# Patient Record
Sex: MALE | Race: WHITE | Employment: OTHER | URBAN - NONMETROPOLITAN AREA
[De-identification: names, ages, dates, MRNs, and addresses within clinical notes are randomized per-mention and may not be internally consistent; named-entity substitution may affect disease eponyms.]

---

## 2021-08-23 ENCOUNTER — HOSPITAL ENCOUNTER (OUTPATIENT)
Dept: INFUSION THERAPY | Age: 56
Discharge: HOME | End: 2021-08-23
Payer: COMMERCIAL

## 2021-08-23 ENCOUNTER — OFFICE VISIT (OUTPATIENT)
Dept: ONCOLOGY | Age: 56
End: 2021-08-23
Payer: COMMERCIAL

## 2021-08-23 VITALS
HEIGHT: 69 IN | TEMPERATURE: 97.3 F | OXYGEN SATURATION: 97 % | SYSTOLIC BLOOD PRESSURE: 148 MMHG | HEART RATE: 75 BPM | BODY MASS INDEX: 32.64 KG/M2 | RESPIRATION RATE: 18 BRPM | DIASTOLIC BLOOD PRESSURE: 92 MMHG | WEIGHT: 220.4 LBS

## 2021-08-23 DIAGNOSIS — R71.8 ELEVATED HEMATOCRIT: ICD-10-CM

## 2021-08-23 DIAGNOSIS — D58.2 ELEVATED HEMOGLOBIN (HCC): Primary | ICD-10-CM

## 2021-08-23 DIAGNOSIS — T38.7X5A ADVERSE EFFECT OF TESTOSTERONE, INITIAL ENCOUNTER: ICD-10-CM

## 2021-08-23 DIAGNOSIS — D75.1 ERYTHROCYTOSIS: ICD-10-CM

## 2021-08-23 PROCEDURE — 99211 OFF/OP EST MAY X REQ PHY/QHP: CPT

## 2021-08-23 PROCEDURE — 99244 OFF/OP CNSLTJ NEW/EST MOD 40: CPT | Performed by: INTERNAL MEDICINE

## 2021-08-23 RX ORDER — TAMSULOSIN HYDROCHLORIDE 0.4 MG/1
0.4 CAPSULE ORAL DAILY
COMMUNITY

## 2021-08-23 RX ORDER — BACLOFEN 10 MG/1
10 TABLET ORAL 3 TIMES DAILY
COMMUNITY

## 2021-08-23 RX ORDER — LEVOTHYROXINE SODIUM 0.05 MG/1
50 TABLET ORAL DAILY
COMMUNITY

## 2021-08-23 RX ORDER — IBUPROFEN 200 MG
200 TABLET ORAL EVERY 6 HOURS PRN
COMMUNITY

## 2021-08-23 RX ORDER — SILDENAFIL 100 MG/1
100 TABLET, FILM COATED ORAL PRN
COMMUNITY

## 2021-08-23 RX ORDER — MELOXICAM 15 MG/1
15 TABLET ORAL DAILY
COMMUNITY

## 2021-08-23 RX ORDER — TIZANIDINE 4 MG/1
4 TABLET ORAL EVERY 6 HOURS PRN
COMMUNITY

## 2021-08-23 RX ORDER — CETIRIZINE HYDROCHLORIDE 10 MG/1
10 TABLET ORAL DAILY
COMMUNITY

## 2021-08-23 RX ORDER — LOPERAMIDE HYDROCHLORIDE 2 MG/1
2 CAPSULE ORAL 4 TIMES DAILY PRN
COMMUNITY

## 2021-08-23 RX ORDER — TESTOSTERONE 30 MG/1.5ML
1 SOLUTION TOPICAL WEEKLY
COMMUNITY
End: 2022-01-04 | Stop reason: ALTCHOICE

## 2021-08-23 RX ORDER — PANTOPRAZOLE SODIUM 40 MG/1
40 TABLET, DELAYED RELEASE ORAL DAILY
COMMUNITY

## 2021-08-23 RX ORDER — ASPIRIN 81 MG/1
81 TABLET ORAL DAILY
COMMUNITY

## 2021-08-23 RX ORDER — SUCRALFATE 1 G/1
1 TABLET ORAL 4 TIMES DAILY
COMMUNITY

## 2021-08-23 RX ORDER — ATENOLOL 50 MG/1
50 TABLET ORAL DAILY
COMMUNITY

## 2021-08-23 RX ORDER — FLUOXETINE HYDROCHLORIDE 20 MG/1
20 CAPSULE ORAL DAILY
COMMUNITY

## 2021-08-23 NOTE — PATIENT INSTRUCTIONS
1.  Schedule phlebotomy therapy x1 at THE [facility] in [place]. 2.  Labs on return to clinic.

## 2021-09-12 NOTE — PROGRESS NOTES
[facility] CENTER  CANCER NETWORK OF [facility]  [facility] [address] [facility]  [address]  [address]  Dept: [phone]  Dept Fax: [phone]: [phone]     Encounter Date:  08/23/2021    Primary Provider: MD Dr. Neftali Villasenor,    Thank you for your referral of this patient for evaluation of erythrocytosis. I appreciate your  trust of my care for your patients. I know that you know this patient's history well, but I will summarize for my records. In addition, my recommendations and plan of care are summarized below. Please call if you have any questions or if I can be of any further assistance to you or this patient. Leon Rojas:      Chief Complaint:  Prisca La is a 64 y.o. with a history of elevated red blood cell count. HPI:  This is the first visit to the [facility] for this patient who was referred by Anastasiia Fitzpatrick MD for evaluation of erythrocytosis. The patient is a pleasant  male who has a history of erythrocytosis. He has had previous hematology evaluation and therapy by Dr. Ruiz Monday. His hematological condition is primarily erythrocytosis as the patient has not had leukocytosis or thrombocytosis. Of most significant note the patient has been treated with testosterone. This is almost certainly contributing or the likely sole factor of his erythrocytosis. On laboratory studies his hemoglobin and hematocrit are elevated but his total white blood cell count and platelet count are normal.  The patient has received intermittent phlebotomy therapy to maintain adequate hemoglobin hematocrit levels. Kelly Pacheco has been having his CBC checked approximately once every eight weeks to make a decision regarding proceeding with phlebotomy therapy. He has also been on a daily aspirin.   He reports that he has been on testosterone injections weekly 97.3 °F (36.3 °C)   SpO2: 97%   Vitals reviewed and are stable. Constitutional: Well-developed. No acute distress. HENT: Normocephalic and atraumatic. Eyes: PERRL. No scleral icterus. Neck: Overall appearance is symmetrical. No identifiable mass. Chest: Inspection and palpation of chest is normal.  Pulmonary: Effort normal. No respiratory distress. Cardiovascular: RRR. No edema in any of the four extremities. Abdominal: No hepatomegaly or splenomegaly. Musculoskeletal: Gait is normal. Muscle strength and tone appear normal.  Neurological: Alert and oriented to person, place, and time. Judgment and thought content normal.  Skin: Skin appears warm and dry. Psychiatric: Mood and affect appropriate for the clinical situation. Assessment:   1. Erythrocytosis. 2.  Elevated hemoglobin. 3.  Elevated hematocrit. 4.  Chronic testosterone use. Recommendations:   1. Proceed with phlebotomy therapy x1 at [address] [place] [address].  2.  Monitor hemoglobin and hematocrit. 3.  Patient will discuss with his primary care provider the possibility of discontinuing the use of testosterone and monitoring for symptoms related to low testosterone levels. Multiple questions were answered throughout the course the consultation. By the end of the consultation, all the patient's questions had been answered. The patient was asked to call if there were any questions or concerns prior to the next scheduled clinic visit. Deandre Patel M.D.                                                                          Medical Director: [facility]  Cancer [address] [facility], [address] [place] [address], [address] [place] [address], [address] [place] [address] of the [facility] at the [facility]      **This report has been created using voice recognition software. It may contain minor errors which are inherent in voice recognition technology. **

## 2021-09-27 ENCOUNTER — HOSPITAL ENCOUNTER (OUTPATIENT)
Dept: INFUSION THERAPY | Age: 56
Discharge: HOME | End: 2021-09-27
Payer: COMMERCIAL

## 2021-09-27 ENCOUNTER — OFFICE VISIT (OUTPATIENT)
Dept: ONCOLOGY | Age: 56
End: 2021-09-27
Payer: COMMERCIAL

## 2021-09-27 VITALS
RESPIRATION RATE: 16 BRPM | WEIGHT: 220.8 LBS | OXYGEN SATURATION: 93 % | DIASTOLIC BLOOD PRESSURE: 77 MMHG | HEART RATE: 64 BPM | TEMPERATURE: 98 F | BODY MASS INDEX: 32.7 KG/M2 | HEIGHT: 69 IN | SYSTOLIC BLOOD PRESSURE: 125 MMHG

## 2021-09-27 DIAGNOSIS — R71.8 ELEVATED HEMATOCRIT: ICD-10-CM

## 2021-09-27 DIAGNOSIS — D58.2 ELEVATED HEMOGLOBIN (HCC): ICD-10-CM

## 2021-09-27 DIAGNOSIS — D58.2 ELEVATED HEMOGLOBIN (HCC): Primary | ICD-10-CM

## 2021-09-27 LAB
ABSOLUTE IMMATURE GRANULOCYTE: 0.01 THOU/MM3 (ref 0–0.07)
BASINOPHIL, AUTOMATED: 0 % (ref 0–3)
BASOPHILS ABSOLUTE: 0 THOU/MM3 (ref 0–0.1)
BUN, WHOLE BLOOD: 16 MG/DL (ref 8–26)
CHLORIDE, WHOLE BLOOD: 99 MEQ/L (ref 98–109)
CREATININE, WHOLE BLOOD: 1.1 MG/DL (ref 0.5–1.2)
EOSINOPHILS ABSOLUTE: 0.1 THOU/MM3 (ref 0–0.4)
EOSINOPHILS RELATIVE PERCENT: 1 % (ref 0–4)
GFR, ESTIMATED ,CON: 74 ML/MIN/1.73M2
GLUCOSE, WHOLE BLOOD: 98 MG/DL (ref 70–108)
HCT VFR BLD CALC: 49.1 % (ref 42–52)
HEMOGLOBIN: 17.5 GM/DL (ref 14–18)
IMMATURE GRANULOCYTES: 0 %
IONIZED CALCIUM, WHOLE BLOOD: 1.1 MMOL/L (ref 1.12–1.32)
LYMPHOCYTES # BLD: 35 % (ref 15–47)
LYMPHOCYTES ABSOLUTE: 1.9 THOU/MM3 (ref 1–4.8)
MCH RBC QN AUTO: 33.8 PG (ref 26–33)
MCHC RBC AUTO-ENTMCNC: 35.6 GM/DL (ref 32.2–35.5)
MCV RBC AUTO: 95 FL (ref 80–94)
MONOCYTES ABSOLUTE: 0.5 THOU/MM3 (ref 0.4–1.3)
MONOCYTES: 9 % (ref 0–12)
PDW BLD-RTO: 11.7 % (ref 11.5–14.5)
PLATELET # BLD: 154 THOU/MM3 (ref 130–400)
PMV BLD AUTO: 10.3 FL (ref 9.4–12.4)
POTASSIUM, WHOLE BLOOD: 3.1 MEQ/L (ref 3.5–4.9)
RBC # BLD: 5.18 MILL/MM3 (ref 4.7–6.1)
SEG NEUTROPHILS: 55 % (ref 43–75)
SEGMENTED NEUTROPHILS ABSOLUTE COUNT: 3 THOU/MM3 (ref 1.8–7.7)
SODIUM, WHOLE BLOOD: 142 MEQ/L (ref 138–146)
TOTAL CO2, WHOLE BLOOD: 29 MEQ/L (ref 23–33)
WBC # BLD: 5.6 THOU/MM3 (ref 4.8–10.8)

## 2021-09-27 PROCEDURE — 80047 BASIC METABLC PNL IONIZED CA: CPT

## 2021-09-27 PROCEDURE — 85025 COMPLETE CBC W/AUTO DIFF WBC: CPT

## 2021-09-27 PROCEDURE — 99211 OFF/OP EST MAY X REQ PHY/QHP: CPT

## 2021-09-27 PROCEDURE — 99213 OFFICE O/P EST LOW 20 MIN: CPT | Performed by: INTERNAL MEDICINE

## 2021-09-27 PROCEDURE — 36415 COLL VENOUS BLD VENIPUNCTURE: CPT

## 2021-09-27 NOTE — PROGRESS NOTES
[facility] CANCER CENTER  CANCER NETWORK OF [facility]  [facility] [address] [facility] 98  [address]  [address]  Dept: [phone]  Dept Fax: [phone]: [phone]     Encounter Date:  08/23/2021    Primary Provider: Ari Dexter MD     Subjective:      Chief Complaint:  Paola Kamara is a 64 y.o. with a history of elevated red blood cell count. The patient is a pleasant  male who has a history of erythrocytosis. He has had previous hematology evaluation and therapy by Dr. Pavel Saavedra. His hematological condition is primarily erythrocytosis as the patient has not had leukocytosis or thrombocytosis. Of most significant note the patient has been treated with testosterone. This is almost certainly contributing or the likely sole factor of his erythrocytosis. On laboratory studies his hemoglobin and hematocrit are elevated but his total white blood cell count and platelet count are normal.  The patient has received intermittent phlebotomy therapy to maintain adequate hemoglobin hematocrit levels. Traci Farrell has been having his CBC checked approximately once every eight weeks to make a decision regarding proceeding with phlebotomy therapy. HPI:    The patient is here today for follow-up regarding his history of elevated hemoglobin elevated hematocrit. Since being seen here last he did undergo 1 phlebotomy therapy without significant complications. On laboratory studies today his hemoglobin and hematocrit have improved. The patient states that he has stopped using testosterone therapy which is the most likely contributing factor to his elevated hemoglobin and hematocrit. He currently feels well at this time. The patient denies shortness of breath, chest pain, a change in bowel habits or a change in bladder habits. ECOG performance status is level 0.       PMH, SH, and FH:  I reviewed the patients medication list and allergy list as noted on the electronic medical record. The PMH, SH and FH were also reviewed as noted on the EMR. Review of Systems  Constitutional: Negative. HENT: Negative. Eyes: Negative. Respiratory: Negative. Cardiovascular: Negative. Gastrointestinal: Negative. Genitourinary: Negative. Musculoskeletal: Negative. Skin: Negative. Neurological: Negative. Hematological: Negative. Psychiatric/Behavioral: Negative. Objective:   Physical Exam  Vitals:    09/27/21 1357   BP: 125/77   Pulse: 64   Resp: 16   Temp: 98 °F (36.7 °C)   SpO2: 93%   Vitals reviewed and are stable. Constitutional: Well-developed. No acute distress. HENT: Normocephalic and atraumatic. Eyes: Pupils appear equal and reactive. Neck: Overall appearance is symmetrical. No identifiable masses. Pulmonary: Effort normal. No respiratory distress. .  Neurological: Alert and oriented to person, place, and time. Judgment and thought content normal.  Skin: Skin is warm and dry. No rash. Psychiatric: Mood and affect appropriate for the clinical situation. Data Analysis: The following laboratory studies were reviewed with the patient today:    Hematology 9/27/2021   WBC 5.6   RBC 5.18   HGB 17.5   HCT 49.1   MCV 95 (H)   RDW 11.7        Assessment:   1. Erythrocytosis. 2.  Elevated hemoglobin. 3.  Elevated hematocrit. 4.  History of exogenous testosterone use. Recommendations:   1. Monitor hemoglobin and hematocrit. 2.  Avoid the use of testosterone therapy as tolerated. Charles Griffin M.D.                                                                          Medical Director: [address] [facility], [address] [place] [address] [place] of the [facility] at the [facility]      **This report has been created using voice recognition software. It may contain minor errors which are inherent in voice recognition technology. **

## 2022-01-04 ENCOUNTER — OFFICE VISIT (OUTPATIENT)
Dept: ONCOLOGY | Age: 57
End: 2022-01-04
Payer: COMMERCIAL

## 2022-01-04 ENCOUNTER — HOSPITAL ENCOUNTER (OUTPATIENT)
Dept: INFUSION THERAPY | Age: 57
Discharge: HOME | End: 2022-01-04
Payer: COMMERCIAL

## 2022-01-04 VITALS
WEIGHT: 226.8 LBS | HEIGHT: 69 IN | DIASTOLIC BLOOD PRESSURE: 71 MMHG | HEART RATE: 83 BPM | BODY MASS INDEX: 33.59 KG/M2 | SYSTOLIC BLOOD PRESSURE: 131 MMHG | OXYGEN SATURATION: 97 % | TEMPERATURE: 98 F | RESPIRATION RATE: 18 BRPM

## 2022-01-04 DIAGNOSIS — D58.2 ELEVATED HEMOGLOBIN (HCC): Primary | ICD-10-CM

## 2022-01-04 DIAGNOSIS — D58.2 ELEVATED HEMOGLOBIN (HCC): ICD-10-CM

## 2022-01-04 DIAGNOSIS — D75.1 ERYTHROCYTOSIS: ICD-10-CM

## 2022-01-04 LAB
ABSOLUTE IMMATURE GRANULOCYTE: 0.06 THOU/MM3 (ref 0–0.07)
ALBUMIN SERPL-MCNC: 4.7 G/DL (ref 3.5–5.1)
ALP BLD-CCNC: 74 U/L (ref 38–126)
ALT SERPL-CCNC: 53 U/L (ref 11–66)
AST SERPL-CCNC: 41 U/L (ref 5–40)
BASINOPHIL, AUTOMATED: 0 % (ref 0–3)
BASOPHILS ABSOLUTE: 0 THOU/MM3 (ref 0–0.1)
BILIRUB SERPL-MCNC: 0.4 MG/DL (ref 0.3–1.2)
BILIRUBIN DIRECT: < 0.2 MG/DL (ref 0–0.3)
BUN, WHOLE BLOOD: 16 MG/DL (ref 8–26)
CHLORIDE, WHOLE BLOOD: 101 MEQ/L (ref 98–109)
CREATININE, WHOLE BLOOD: 1.2 MG/DL (ref 0.5–1.2)
EOSINOPHILS ABSOLUTE: 0 THOU/MM3 (ref 0–0.4)
EOSINOPHILS RELATIVE PERCENT: 0 % (ref 0–4)
GFR, ESTIMATED ,CON: 66 ML/MIN/1.73M2
GLUCOSE, WHOLE BLOOD: 112 MG/DL (ref 70–108)
HCT VFR BLD CALC: 45.6 % (ref 42–52)
HEMOGLOBIN: 15.8 GM/DL (ref 14–18)
IMMATURE GRANULOCYTES: 1 %
IONIZED CALCIUM, WHOLE BLOOD: 1.13 MMOL/L (ref 1.12–1.32)
LYMPHOCYTES # BLD: 25 % (ref 15–47)
LYMPHOCYTES ABSOLUTE: 1.9 THOU/MM3 (ref 1–4.8)
MCH RBC QN AUTO: 32.2 PG (ref 26–33)
MCHC RBC AUTO-ENTMCNC: 34.6 GM/DL (ref 32.2–35.5)
MCV RBC AUTO: 93 FL (ref 80–94)
MONOCYTES ABSOLUTE: 0.4 THOU/MM3 (ref 0.4–1.3)
MONOCYTES: 6 % (ref 0–12)
PDW BLD-RTO: 12.5 % (ref 11.5–14.5)
PLATELET # BLD: 167 THOU/MM3 (ref 130–400)
PMV BLD AUTO: 10.4 FL (ref 9.4–12.4)
POTASSIUM, WHOLE BLOOD: 3.4 MEQ/L (ref 3.5–4.9)
RBC # BLD: 4.91 MILL/MM3 (ref 4.7–6.1)
SEG NEUTROPHILS: 69 % (ref 43–75)
SEGMENTED NEUTROPHILS ABSOLUTE COUNT: 5.3 THOU/MM3 (ref 1.8–7.7)
SODIUM, WHOLE BLOOD: 141 MEQ/L (ref 138–146)
TOTAL CO2, WHOLE BLOOD: 29 MEQ/L (ref 23–33)
TOTAL PROTEIN: 7.3 G/DL (ref 6.1–8)
WBC # BLD: 7.8 THOU/MM3 (ref 4.8–10.8)

## 2022-01-04 PROCEDURE — 99213 OFFICE O/P EST LOW 20 MIN: CPT | Performed by: INTERNAL MEDICINE

## 2022-01-04 PROCEDURE — 80076 HEPATIC FUNCTION PANEL: CPT

## 2022-01-04 PROCEDURE — 85025 COMPLETE CBC W/AUTO DIFF WBC: CPT

## 2022-01-04 PROCEDURE — 80047 BASIC METABLC PNL IONIZED CA: CPT

## 2022-01-04 PROCEDURE — 99211 OFF/OP EST MAY X REQ PHY/QHP: CPT

## 2022-01-04 NOTE — PROGRESS NOTES
General acute hospital CENTER  CANCER NETWORK OF [facility]  [facility] [address] [facility] 98  [address], [facility] [address]  [address]  Dept: [phone]  Dept Fax: [phone]: [phone]     Encounter Date:  01/04/2022    Primary Provider: Kamryn Ruelas MD     Subjective:      Chief Complaint:  Miguel Wheatley is a 64 y.o. with a history of elevated red blood cell count. The patient is a pleasant  male who has a history of erythrocytosis. He has had previous hematology evaluation and therapy by Dr. Lucius Cooper. His hematological condition is primarily erythrocytosis as the patient has not had leukocytosis or thrombocytosis. Of most significant note the patient has been treated with testosterone. HPI:    Soto Harvey is here today for follow-up regarding his history of an elevated hemoglobin and elevated hematocrit related to the use of testosterone. On today's evaluation he states that his testosterone has been completely weaned off. He is no longer taking any form of testosterone supplementation. This is reflected in his laboratory work as on today's evaluation his hemoglobin and hematocrit are both within normal range. The patient has not required any recent phlebotomy therapy. Therefore it does appear that by discontinuing testosterone therapy his hemoglobin hematocrit have normalized. Soto Harvey does not have specific complaints today. The patient denies shortness of breath, chest pain, a change in bowel habits or a change in bladder habits. ECOG performance status is level 0. PMH, SH, and FH:  I reviewed the patients medication list and allergy list as noted on the electronic medical record. The PMH, SH and FH were also reviewed as noted on the EMR. Review of Systems  Constitutional: Negative. HENT: Negative. Eyes: Negative. Respiratory: Negative. Cardiovascular: Negative.    Gastrointestinal: Negative. Genitourinary: Negative. Musculoskeletal: Negative. Skin: Negative. Neurological: Negative. Hematological: Negative. Psychiatric/Behavioral: Negative. Objective:   Physical Exam  Vitals:    01/04/22 1343   BP: 131/71   Pulse: 83   Resp: 18   Temp: 98 °F (36.7 °C)   SpO2: 97%   Vitals reviewed and are stable. Constitutional: Well-developed. No acute distress. HENT: Normocephalic and atraumatic. Eyes: Pupils appear equal and reactive. Neck: Overall appearance is symmetrical. No identifiable masses. Pulmonary: Effort normal. No respiratory distress. .  Neurological: Alert and oriented to person, place, and time. Judgment and thought content normal.  Skin: Skin is warm and dry. No rash. Psychiatric: Mood and affect appropriate for the clinical situation. Data Analysis: The following laboratory studies were reviewed with the patient today:    Hematology 1/4/2022 9/27/2021   WBC 7.8 5.6   RBC 4.91 5.18   HGB 15.8 17.5   HCT 45.6 49.1   MCV 93 95 (H)   RDW 12.5 11.7    154     Assessment:   1. History erythrocytosis related to exogenous use of testosterone therapy. Recommendations: The patient's erythrocytosis has resolved after discontinuation of exogenous testosterone therapy. On today's evaluation as his CBC is completely normal.  The patient will continue to be monitored by his primary care provider for his general health care. He was asked to call or to return to the hematology clinic if we can be of benefit to him in the future. Zeina Vaughn M.D.                                                                          Medical Director: [facility]  Cancer [address] [facility], [address] [place] [address] Ave of the